# Patient Record
Sex: FEMALE
[De-identification: names, ages, dates, MRNs, and addresses within clinical notes are randomized per-mention and may not be internally consistent; named-entity substitution may affect disease eponyms.]

---

## 2017-09-25 ENCOUNTER — RESULT REVIEW (OUTPATIENT)
Age: 47
End: 2017-09-25

## 2018-09-26 ENCOUNTER — RESULT REVIEW (OUTPATIENT)
Age: 48
End: 2018-09-26

## 2019-03-26 PROBLEM — Z00.00 ENCOUNTER FOR PREVENTIVE HEALTH EXAMINATION: Status: ACTIVE | Noted: 2019-03-26

## 2019-09-13 ENCOUNTER — RECORD ABSTRACTING (OUTPATIENT)
Age: 49
End: 2019-09-13

## 2019-09-13 DIAGNOSIS — Z82.49 FAMILY HISTORY OF ISCHEMIC HEART DISEASE AND OTHER DISEASES OF THE CIRCULATORY SYSTEM: ICD-10-CM

## 2019-09-13 DIAGNOSIS — Z87.19 PERSONAL HISTORY OF OTHER DISEASES OF THE DIGESTIVE SYSTEM: ICD-10-CM

## 2019-09-13 DIAGNOSIS — R87.610 ATYPICAL SQUAMOUS CELLS OF UNDETERMINED SIGNIFICANCE ON CYTOLOGIC SMEAR OF CERVIX (ASC-US): ICD-10-CM

## 2019-09-13 DIAGNOSIS — N96 RECURRENT PREGNANCY LOSS: ICD-10-CM

## 2019-09-13 DIAGNOSIS — Z80.41 FAMILY HISTORY OF MALIGNANT NEOPLASM OF OVARY: ICD-10-CM

## 2019-09-13 DIAGNOSIS — Z87.448 PERSONAL HISTORY OF OTHER DISEASES OF URINARY SYSTEM: ICD-10-CM

## 2019-09-13 DIAGNOSIS — D68.59 OTHER PRIMARY THROMBOPHILIA: ICD-10-CM

## 2019-09-13 DIAGNOSIS — Z80.49 FAMILY HISTORY OF MALIGNANT NEOPLASM OF OTHER GENITAL ORGANS: ICD-10-CM

## 2019-09-13 DIAGNOSIS — E11.9 TYPE 2 DIABETES MELLITUS W/OUT COMPLICATIONS: ICD-10-CM

## 2019-09-13 DIAGNOSIS — Z80.3 FAMILY HISTORY OF MALIGNANT NEOPLASM OF BREAST: ICD-10-CM

## 2019-09-13 DIAGNOSIS — E72.12 METHYLENETETRAHYDROFOLATE REDUCTASE DEFICIENCY: ICD-10-CM

## 2019-09-13 DIAGNOSIS — I10 ESSENTIAL (PRIMARY) HYPERTENSION: ICD-10-CM

## 2019-09-13 DIAGNOSIS — Z86.39 PERSONAL HISTORY OF OTHER ENDOCRINE, NUTRITIONAL AND METABOLIC DISEASE: ICD-10-CM

## 2019-09-13 DIAGNOSIS — Z86.19 PERSONAL HISTORY OF OTHER INFECTIOUS AND PARASITIC DISEASES: ICD-10-CM

## 2019-09-13 LAB — CYTOLOGY CVX/VAG DOC THIN PREP: NORMAL

## 2019-09-13 RX ORDER — METOPROLOL SUCCINATE 50 MG/1
50 TABLET, EXTENDED RELEASE ORAL
Refills: 0 | Status: ACTIVE | COMMUNITY

## 2019-10-11 ENCOUNTER — APPOINTMENT (OUTPATIENT)
Dept: OBGYN | Facility: CLINIC | Age: 49
End: 2019-10-11
Payer: COMMERCIAL

## 2019-10-11 VITALS
HEIGHT: 62 IN | SYSTOLIC BLOOD PRESSURE: 144 MMHG | BODY MASS INDEX: 28.16 KG/M2 | WEIGHT: 153 LBS | DIASTOLIC BLOOD PRESSURE: 88 MMHG

## 2019-10-11 DIAGNOSIS — E28.2 POLYCYSTIC OVARIAN SYNDROME: ICD-10-CM

## 2019-10-11 DIAGNOSIS — Z12.31 ENCOUNTER FOR SCREENING MAMMOGRAM FOR MALIGNANT NEOPLASM OF BREAST: ICD-10-CM

## 2019-10-11 PROCEDURE — 99396 PREV VISIT EST AGE 40-64: CPT

## 2019-10-11 PROCEDURE — 99213 OFFICE O/P EST LOW 20 MIN: CPT | Mod: 25

## 2019-10-11 PROCEDURE — 36415 COLL VENOUS BLD VENIPUNCTURE: CPT

## 2019-10-11 RX ORDER — CHOLECALCIFEROL (VITAMIN D3) 50 MCG
100 CAPSULE ORAL
Refills: 0 | Status: COMPLETED | COMMUNITY
End: 2019-10-11

## 2019-10-11 RX ORDER — LACTOBACILLUS ACIDOPHILUS 0.5 MG
TABLET ORAL
Refills: 0 | Status: COMPLETED | COMMUNITY
End: 2019-10-11

## 2019-10-15 LAB
BASOPHILS # BLD AUTO: 0.05 K/UL
BASOPHILS NFR BLD AUTO: 0.7 %
EOSINOPHIL # BLD AUTO: 0.15 K/UL
EOSINOPHIL NFR BLD AUTO: 2.1 %
FSH SERPL-MCNC: 1.8 IU/L
HCT VFR BLD CALC: 36.5 %
HGB BLD-MCNC: 11.3 G/DL
HPV HIGH+LOW RISK DNA PNL CVX: NOT DETECTED
IMM GRANULOCYTES NFR BLD AUTO: 0.1 %
LH SERPL-ACNC: 4.5 IU/L
LYMPHOCYTES # BLD AUTO: 2.16 K/UL
LYMPHOCYTES NFR BLD AUTO: 29.9 %
MAN DIFF?: NORMAL
MCHC RBC-ENTMCNC: 29.5 PG
MCHC RBC-ENTMCNC: 31 GM/DL
MCV RBC AUTO: 95.3 FL
MONOCYTES # BLD AUTO: 0.53 K/UL
MONOCYTES NFR BLD AUTO: 7.3 %
NEUTROPHILS # BLD AUTO: 4.32 K/UL
NEUTROPHILS NFR BLD AUTO: 59.9 %
PLATELET # BLD AUTO: 290 K/UL
PROLACTIN SERPL-MCNC: 6.8 NG/ML
RBC # BLD: 3.83 M/UL
RBC # FLD: 13.9 %
TSH SERPL-ACNC: 1.08 UIU/ML
WBC # FLD AUTO: 7.22 K/UL

## 2019-10-16 NOTE — HISTORY OF PRESENT ILLNESS
[1 Year Ago] : 1 year ago [Good] : being in good health [Healthy Diet] : a healthy diet [Regular Exercise] : regular exercise [Last Pap ___] : Last cervical pap smear was [unfilled] [Pregnancy History] : pregnancy history: [Definite:  ___ (Date)] : the last menstrual period was [unfilled] [Menarche Age: ____] : age at menarche was [unfilled] [Sexually Active] : is sexually active [Male ___] : [unfilled] male [No] : no [Perimenopausal] : is perimenopausal [Tubal Occlusion] : has had a tubal occlusion [Contraception] : does not use contraception [Burning] : no burning [Itching] : no itching [Mass] : no mass [Stinging] : no stinging [Lesion] : no lesion [Soreness] : no soreness [Discharge] : no discharge [Localized Pain] : no localized pain [Mass (___cm)] : no palpable mass [Diffused Pain] : no diffused pain [Nipple Discharge] : no nipple discharge [Skin Color Change] : no skin color change [Hot Flashes] : no hot flashes [Night Sweats] : no night sweats [de-identified] : Essure

## 2019-10-16 NOTE — DISCUSSION/SUMMARY
[FreeTextEntry1] : F/U pap.\par \par Prescription given for mammo and sono due in March 2020. She agrees to change to [facility]. They will obtain her mammo records for comparison.\par \par She is aware of the need for colonoscopy now that she is 49.\par \par Differential diagnosis of dysfunctional uterine bleeding discussed at length including structural, hormonal, and malignant causes. A pelvic ultrasound was ordered as well. We also discussed the need for hysteroscopy with biopsy to R/O structural and malignant causes. The procedure was discussed in detail. She will premedicate with motrin as there is cramping associated with this procedure.\par \par Two weeks after her above work up is completed, she will return to discuss all results and discuss a treatment plan.\par

## 2019-10-16 NOTE — PHYSICAL EXAM
[Awake] : awake [Alert] : alert [Acute Distress] : no acute distress [Mass] : no breast mass [Nipple Discharge] : no nipple discharge [Axillary LAD] : no axillary lymphadenopathy [Soft] : soft [Tender] : non tender [Distended] : not distended [Oriented x3] : oriented to person, place, and time [Depressed Mood] : not depressed [Flat Affect] : affect not flat [Normal] : uterus [Labia Majora] : labia major [Labia Minora] : labia minora [Atrophy] : atrophy [No Bleeding] : there was no active vaginal bleeding [Pap Obtained] : a Pap smear was performed [Tenderness] : nontender [Adnexa Tenderness] : were not tender [No Tenderness] : no rectal tenderness [Occult Blood] : occult blood test from digital rectal exam was negative

## 2019-10-16 NOTE — REVIEW OF SYSTEMS
[Nl] : Integumentary [Incontinence] : incontinence [Fever] : no fever [Chills] : no chills [Dyspnea] : no shortness of breath [Abdominal Pain] : no abdominal pain [Vomiting] : no vomiting [Pelvic Pain] : no pelvic pain [Abn Vag Bleeding] : abnormal vaginal bleeding [Frequency] : no frequency

## 2019-10-19 LAB — CYTOLOGY CVX/VAG DOC THIN PREP: NORMAL

## 2019-11-06 ENCOUNTER — APPOINTMENT (OUTPATIENT)
Dept: OBGYN | Facility: CLINIC | Age: 49
End: 2019-11-06
Payer: COMMERCIAL

## 2019-11-06 ENCOUNTER — ASOB RESULT (OUTPATIENT)
Age: 49
End: 2019-11-06

## 2019-11-06 VITALS
DIASTOLIC BLOOD PRESSURE: 70 MMHG | WEIGHT: 150 LBS | HEIGHT: 62 IN | BODY MASS INDEX: 27.6 KG/M2 | SYSTOLIC BLOOD PRESSURE: 138 MMHG

## 2019-11-06 LAB
HCG UR QL: NEGATIVE
QUALITY CONTROL: YES

## 2019-11-06 PROCEDURE — 76830 TRANSVAGINAL US NON-OB: CPT

## 2019-11-06 PROCEDURE — 81025 URINE PREGNANCY TEST: CPT

## 2019-11-06 PROCEDURE — 58558Z: CUSTOM

## 2019-11-06 NOTE — HISTORY OF PRESENT ILLNESS
[Last Mammogram ___] : Last Mammogram was [unfilled] [Last Pap ___] : Last cervical pap smear was [unfilled] [Reproductive Age] : is of reproductive age [Pregnancy History] : pregnancy history: [Definite:  ___ (Date)] : the last menstrual period was [unfilled] [Menarche Age: ____] : age at menarche was [unfilled] [Sexually Active] : is sexually active [Monogamous] : is monogamous [Male ___] : [unfilled] male [Menstrual Problems] : reports abnormal menses [Total Preg ___] : [unfilled] [Full Term ___] : [unfilled] [Living ___] : [unfilled] [AB Spont ___] : miscarriages: [unfilled] [Contraception] : uses contraception [de-identified] : 10/11/2019 [de-identified] : Breast u/s: 03/18/2019 BR2 [de-identified] : Essure [de-identified] : Essure

## 2019-11-06 NOTE — END OF VISIT
[FreeTextEntry3] : I, John Bowers, acted solely as a scribe for Dr. Camilo on this date 11/06/2019.\par All medical record entries made by the Scribe were at my, Dr. Camilo's direction and personally dictated by me on  11/06/2019. I have reviewed the chart and agree that the record accurately reflects my personal performance of the history, physical exam, assessment and plan. I have also personally directed, reviewed, and agreed with the chart.\par \par

## 2019-11-10 LAB — CORE LAB BIOPSY: NORMAL

## 2020-02-26 ENCOUNTER — ASOB RESULT (OUTPATIENT)
Age: 50
End: 2020-02-26

## 2020-02-26 ENCOUNTER — APPOINTMENT (OUTPATIENT)
Dept: OBGYN | Facility: CLINIC | Age: 50
End: 2020-02-26
Payer: COMMERCIAL

## 2020-02-26 VITALS
BODY MASS INDEX: 27.97 KG/M2 | HEIGHT: 62 IN | DIASTOLIC BLOOD PRESSURE: 74 MMHG | WEIGHT: 152 LBS | SYSTOLIC BLOOD PRESSURE: 124 MMHG

## 2020-02-26 PROCEDURE — 99213 OFFICE O/P EST LOW 20 MIN: CPT | Mod: 25

## 2020-02-26 PROCEDURE — 76830 TRANSVAGINAL US NON-OB: CPT

## 2020-02-26 NOTE — REVIEW OF SYSTEMS
[Nl] : Musculoskeletal [Fever] : no fever [Abn Vag Bleeding] : abnormal vaginal bleeding [Chills] : no chills [Pelvic Pain] : no pelvic pain

## 2020-02-26 NOTE — PROCEDURE
[Abnormal Uterine Bleeding] : abnormal uterine bleeding [Transvaginal Ultrasound] : transvaginal ultrasound [Suspected Polycystic Ovaries] : suspected polycystic ovaries [Anteverted] : anteverted [Present] : uterus present [L: ___ cm] : L: [unfilled] cm [W: ___cm] : W: [unfilled] cm [H: ___ cm] : H: [unfilled] cm [FreeTextEntry7] : 3.62x1.7x1.93 [FreeTextEntry4] : endo thickness 1.4cm, RT cyst no longer seen, unchanged fibroid [FreeTextEntry8] : oophorectomy

## 2020-02-26 NOTE — HISTORY OF PRESENT ILLNESS
[___ Month(s) Ago] : [unfilled] month(s) ago [Good] : being in good health [Last Mammogram ___] : Last Mammogram was [unfilled] [Last Pap ___] : Last cervical pap smear was [unfilled] [Perimenopausal] : is perimenopausal [Contraception] : uses contraception [Pregnancy History] : pregnancy history: [Sexually Active] : is sexually active [Male ___] : [unfilled] male [Definite:  ___ (Date)] : the last menstrual period was [unfilled] [Menarche Age: ____] : age at menarche was [unfilled] [Menstrual Problems] : reports normal menses

## 2020-02-26 NOTE — PHYSICAL EXAM
[Alert] : alert [Awake] : awake [Oriented x3] : oriented to person, place, and time [Acute Distress] : no acute distress [Flat Affect] : affect not flat [Depressed Mood] : not depressed

## 2020-11-04 ENCOUNTER — RESULT REVIEW (OUTPATIENT)
Age: 50
End: 2020-11-04

## 2020-11-04 ENCOUNTER — APPOINTMENT (OUTPATIENT)
Dept: MAMMOGRAPHY | Facility: CLINIC | Age: 50
End: 2020-11-04
Payer: COMMERCIAL

## 2020-11-04 ENCOUNTER — APPOINTMENT (OUTPATIENT)
Dept: ULTRASOUND IMAGING | Facility: CLINIC | Age: 50
End: 2020-11-04
Payer: COMMERCIAL

## 2020-11-04 PROCEDURE — 76641 ULTRASOUND BREAST COMPLETE: CPT | Mod: 50

## 2020-11-04 PROCEDURE — 77063 BREAST TOMOSYNTHESIS BI: CPT

## 2020-11-04 PROCEDURE — 77067 SCR MAMMO BI INCL CAD: CPT

## 2020-12-21 PROBLEM — Z12.31 ENCOUNTER FOR SCREENING MAMMOGRAM FOR BREAST CANCER: Status: RESOLVED | Noted: 2019-10-11 | Resolved: 2020-12-21

## 2021-01-22 ENCOUNTER — APPOINTMENT (OUTPATIENT)
Dept: OBGYN | Facility: CLINIC | Age: 51
End: 2021-01-22
Payer: COMMERCIAL

## 2021-01-22 VITALS
DIASTOLIC BLOOD PRESSURE: 78 MMHG | SYSTOLIC BLOOD PRESSURE: 130 MMHG | HEIGHT: 62 IN | BODY MASS INDEX: 29.12 KG/M2 | WEIGHT: 158.25 LBS

## 2021-01-22 DIAGNOSIS — Z87.42 PERSONAL HISTORY OF OTHER DISEASES OF THE FEMALE GENITAL TRACT: ICD-10-CM

## 2021-01-22 DIAGNOSIS — Z01.419 ENCOUNTER FOR GYNECOLOGICAL EXAMINATION (GENERAL) (ROUTINE) W/OUT ABNORMAL FINDINGS: ICD-10-CM

## 2021-01-22 DIAGNOSIS — Z80.42 FAMILY HISTORY OF MALIGNANT NEOPLASM OF PROSTATE: ICD-10-CM

## 2021-01-22 DIAGNOSIS — K58.9 IRRITABLE BOWEL SYNDROME W/OUT DIARRHEA: ICD-10-CM

## 2021-01-22 DIAGNOSIS — Z12.31 ENCOUNTER FOR SCREENING MAMMOGRAM FOR MALIGNANT NEOPLASM OF BREAST: ICD-10-CM

## 2021-01-22 DIAGNOSIS — E88.81 METABOLIC SYNDROME: ICD-10-CM

## 2021-01-22 PROCEDURE — 99396 PREV VISIT EST AGE 40-64: CPT

## 2021-01-22 PROCEDURE — 82270 OCCULT BLOOD FECES: CPT

## 2021-01-22 PROCEDURE — 99072 ADDL SUPL MATRL&STAF TM PHE: CPT

## 2021-01-26 PROBLEM — K58.9 IBS (IRRITABLE BOWEL SYNDROME): Status: ACTIVE | Noted: 2021-01-22

## 2021-01-26 PROBLEM — Z01.419 WELL WOMAN EXAM WITH ROUTINE GYNECOLOGICAL EXAM: Status: RESOLVED | Noted: 2019-10-11 | Resolved: 2021-01-26

## 2021-01-26 PROBLEM — Z87.42 HISTORY OF MENORRHAGIA: Status: RESOLVED | Noted: 2019-10-11 | Resolved: 2021-01-26

## 2021-01-26 PROBLEM — E88.81 INSULIN RESISTANCE: Status: ACTIVE | Noted: 2021-01-22

## 2021-01-26 LAB — HPV HIGH+LOW RISK DNA PNL CVX: NOT DETECTED

## 2021-01-26 NOTE — END OF VISIT
[FreeTextEntry3] : I, John Bowers, acted solely as a scribe for Dr. Camilo on this date 01/22/2021.\par All medical record entries made by the Scribe were at my, Dr. Camilo's direction and personally dictated by me on  01/22/2021. I have reviewed the chart and agree that the record accurately reflects my personal performance of the history, physical exam, assessment and plan. I have also personally directed, reviewed, and agreed with the chart.\par

## 2021-01-26 NOTE — PHYSICAL EXAM
[Appropriately responsive] : appropriately responsive [Alert] : alert [No Acute Distress] : no acute distress [Soft] : soft [Non-tender] : non-tender [Non-distended] : non-distended [Oriented x3] : oriented x3 [Examination Of The Breasts] : a normal appearance [No Masses] : no breast masses were palpable [Labia Majora] : normal [Labia Minora] : normal [No Bleeding] : There was no active vaginal bleeding [Normal] : normal [Uterine Adnexae] : normal [No Tenderness] : no tenderness [Nl Sphincter Tone] : normal sphincter tone [FreeTextEntry9] : Guaiac negative

## 2021-01-26 NOTE — DISCUSSION/SUMMARY
[FreeTextEntry1] : F/U pap.\par \par Self breast exam was reviewed.\par \par We discussed the need for screening colonoscopy. \par \par Prescription for mammogram screening and breast US were given today.\par \par She will follow up annually and as needed.\par

## 2021-01-26 NOTE — REVIEW OF SYSTEMS
[Anxiety] : anxiety [Negative] : Heme/Lymph [Patient Intake Form Reviewed] : Patient intake form was reviewed

## 2021-01-26 NOTE — HISTORY OF PRESENT ILLNESS
[Patient reported PAP Smear was normal] : Patient reported PAP Smear was normal [Menarche Age ____] : age at menarche was [unfilled] [Definite ___ (Date)] : the last menstrual period was [unfilled] [Excessive Bleeding] : bleeding has been excessive [Regular Cycle Intervals] : have been regular [Monogamous (Male Partner)] : is monogamous with a male partner [Y] : Positive pregnancy history [Menarche Age: ____] : age at menarche was [unfilled] [Currently Active] : currently active [Men] : men [Vaginal] : vaginal [No] : No [Patient refuses STI testing] : Patient refuses STI testing [TextBox_4] : PATIENT PRESENTS FOR ANNUAL  [Mammogramdate] : 11/04/20 [TextBox_19] : BR2 [BreastSonogramDate] : 11/04/20 [TextBox_25] : BR2 [PapSmeardate] : 10/11/19 [TextBox_31] : NEG  [BoneDensityDate] : N/A [ColonoscopyDate] : N/A [LMPDate] : 01/10/21 [PGxTotal] : 7 [[facility]] : 4 [[facility]] : 4 [PGxABSpont] : 3 [FreeTextEntry1] : 01/10/21 [FreeTextEntry3] : ESSURE

## 2021-01-27 LAB — CYTOLOGY CVX/VAG DOC THIN PREP: NORMAL

## 2022-01-25 ENCOUNTER — APPOINTMENT (OUTPATIENT)
Dept: OBGYN | Facility: CLINIC | Age: 52
End: 2022-01-25
Payer: COMMERCIAL

## 2022-01-25 VITALS
WEIGHT: 137 LBS | BODY MASS INDEX: 25.21 KG/M2 | DIASTOLIC BLOOD PRESSURE: 74 MMHG | SYSTOLIC BLOOD PRESSURE: 124 MMHG | HEIGHT: 62 IN

## 2022-01-25 DIAGNOSIS — Z86.39 PERSONAL HISTORY OF OTHER ENDOCRINE, NUTRITIONAL AND METABOLIC DISEASE: ICD-10-CM

## 2022-01-25 DIAGNOSIS — Z12.11 ENCOUNTER FOR SCREENING FOR MALIGNANT NEOPLASM OF COLON: ICD-10-CM

## 2022-01-25 DIAGNOSIS — R92.2 INCONCLUSIVE MAMMOGRAM: ICD-10-CM

## 2022-01-25 DIAGNOSIS — Z12.31 ENCOUNTER FOR SCREENING MAMMOGRAM FOR MALIGNANT NEOPLASM OF BREAST: ICD-10-CM

## 2022-01-25 DIAGNOSIS — Z01.419 ENCOUNTER FOR GYNECOLOGICAL EXAMINATION (GENERAL) (ROUTINE) W/OUT ABNORMAL FINDINGS: ICD-10-CM

## 2022-01-25 PROCEDURE — 82270 OCCULT BLOOD FECES: CPT

## 2022-01-25 PROCEDURE — 99396 PREV VISIT EST AGE 40-64: CPT

## 2022-01-25 RX ORDER — LISINOPRIL 10 MG/1
10 TABLET ORAL
Refills: 0 | Status: ACTIVE | COMMUNITY

## 2022-01-27 LAB
DATE COLLECTED: NORMAL
HEMOCCULT SP1 STL QL: NEGATIVE
HPV HIGH+LOW RISK DNA PNL CVX: NOT DETECTED
QUALITY CONTROL: YES

## 2022-01-28 NOTE — END OF VISIT
[FreeTextEntry3] : I, Ollie Whittaker solely acted as scribe for Dr. Cyndi Camilo on 01/25/2022 \par All medical entries made by the Scribe were at my, Dr. Camilo’s, direction and personally\par dictated by me on 01/25/2022 . I have reviewed the chart and agree that the record\par accurately reflects my personal performance of the history, physical exam, assessment and plan. I\par have also personally directed, reviewed, and agreed with the chart.

## 2022-01-28 NOTE — HISTORY OF PRESENT ILLNESS
[N] : Patient reports normal menses [Y] : Positive pregnancy history [Menarche Age: ____] : age at menarche was [unfilled] [Currently Active] : currently active [No] : No [Mammogramdate] : 11/04/20 [TextBox_19] : BR2 [BreastSonogramDate] : 11/4/20 [TextBox_25] : BR2 [PapSmeardate] : 01/22/21 [TextBox_31] : NEG [HPVDate] : 01/22/21 [TextBox_78] : NEG [LMPDate] : 01/25/22 [PGxTotal] : 7 [[facility]] : 4 [facility]] : 4 [PGxABSpont] : 3 [FreeTextEntry1] : 01/25/22

## 2022-01-28 NOTE — PHYSICAL EXAM
[Chaperone Present] : A chaperone was present in the examining room during all aspects of the physical examination [Appropriately responsive] : appropriately responsive [Alert] : alert [No Acute Distress] : no acute distress [Soft] : soft [Non-tender] : non-tender [Non-distended] : non-distended [Oriented x3] : oriented x3 [Examination Of The Breasts] : a normal appearance [No Discharge] : no discharge [No Masses] : no breast masses were palpable [Scant] : There was scant vaginal bleeding [Normal] : normal [Uterine Adnexae] : normal [FreeTextEntry1] : MA: Jamie [FreeTextEntry9] : Stool for occult blood.

## 2022-01-31 LAB — CYTOLOGY CVX/VAG DOC THIN PREP: NORMAL

## 2022-02-22 ENCOUNTER — APPOINTMENT (OUTPATIENT)
Dept: ULTRASOUND IMAGING | Facility: CLINIC | Age: 52
End: 2022-02-22

## 2022-02-22 ENCOUNTER — APPOINTMENT (OUTPATIENT)
Dept: MAMMOGRAPHY | Facility: CLINIC | Age: 52
End: 2022-02-22
Payer: COMMERCIAL

## 2022-02-22 ENCOUNTER — RESULT REVIEW (OUTPATIENT)
Age: 52
End: 2022-02-22

## 2022-02-22 PROCEDURE — 77063 BREAST TOMOSYNTHESIS BI: CPT

## 2022-02-22 PROCEDURE — 77067 SCR MAMMO BI INCL CAD: CPT

## 2022-02-22 PROCEDURE — 76641 ULTRASOUND BREAST COMPLETE: CPT | Mod: 50

## 2022-03-21 ENCOUNTER — NON-APPOINTMENT (OUTPATIENT)
Age: 52
End: 2022-03-21

## 2023-03-02 ENCOUNTER — NON-APPOINTMENT (OUTPATIENT)
Age: 53
End: 2023-03-02

## 2023-03-02 ENCOUNTER — APPOINTMENT (OUTPATIENT)
Dept: OBGYN | Facility: CLINIC | Age: 53
End: 2023-03-02
Payer: COMMERCIAL

## 2023-03-02 VITALS
BODY MASS INDEX: 26.31 KG/M2 | SYSTOLIC BLOOD PRESSURE: 160 MMHG | DIASTOLIC BLOOD PRESSURE: 86 MMHG | WEIGHT: 143 LBS | HEIGHT: 62 IN

## 2023-03-02 DIAGNOSIS — R92.2 INCONCLUSIVE MAMMOGRAM: ICD-10-CM

## 2023-03-02 DIAGNOSIS — N83.299 OTHER OVARIAN CYST, UNSPECIFIED SIDE: ICD-10-CM

## 2023-03-02 DIAGNOSIS — Z12.11 ENCOUNTER FOR SCREENING FOR MALIGNANT NEOPLASM OF COLON: ICD-10-CM

## 2023-03-02 DIAGNOSIS — Z87.2 PERSONAL HISTORY OF DISEASES OF THE SKIN AND SUBCUTANEOUS TISSUE: ICD-10-CM

## 2023-03-02 DIAGNOSIS — Z87.898 PERSONAL HISTORY OF OTHER SPECIFIED CONDITIONS: ICD-10-CM

## 2023-03-02 DIAGNOSIS — Z01.411 ENCOUNTER FOR GYNECOLOGICAL EXAMINATION (GENERAL) (ROUTINE) WITH ABNORMAL FINDINGS: ICD-10-CM

## 2023-03-02 DIAGNOSIS — Z12.31 ENCOUNTER FOR SCREENING MAMMOGRAM FOR MALIGNANT NEOPLASM OF BREAST: ICD-10-CM

## 2023-03-02 PROCEDURE — 99396 PREV VISIT EST AGE 40-64: CPT

## 2023-03-02 RX ORDER — CYANOCOBALAMIN
1000 KIT
Refills: 0 | Status: ACTIVE | COMMUNITY

## 2023-03-02 RX ORDER — UREA 10 %
50 LOTION (ML) TOPICAL
Refills: 0 | Status: ACTIVE | COMMUNITY

## 2023-03-02 RX ORDER — ZINC SULFATE 50(220)MG
50 CAPSULE ORAL
Refills: 0 | Status: ACTIVE | COMMUNITY

## 2023-03-06 LAB — HPV HIGH+LOW RISK DNA PNL CVX: NOT DETECTED

## 2023-03-06 NOTE — HISTORY OF PRESENT ILLNESS
[N] : Patient reports normal menses [Y] : Positive pregnancy history [Menarche Age: ____] : age at menarche was [unfilled] [Currently Active] : currently active [No] : No [Mammogramdate] : 02/22/2022 [TextBox_19] : BR1 [BreastSonogramDate] : 02/22/2022 [TextBox_25] : BR1 [PapSmeardate] : 01/25/2022 [TextBox_31] : NEG [HPVDate] : 01/25/2022 [TextBox_78] : NEG [LMPDate] : 01/25/2023 [PGxTotal] : 7 [[facility]xFullTerm] : 4 [[facility]xLiving] : 4 [PGxABSpont] : 3 [FreeTextEntry1] : 01/25/2023

## 2023-03-08 LAB — CYTOLOGY CVX/VAG DOC THIN PREP: NORMAL

## 2024-04-08 ENCOUNTER — APPOINTMENT (OUTPATIENT)
Dept: OBGYN | Facility: CLINIC | Age: 54
End: 2024-04-08
Payer: COMMERCIAL

## 2024-04-08 ENCOUNTER — NON-APPOINTMENT (OUTPATIENT)
Age: 54
End: 2024-04-08

## 2024-04-08 ENCOUNTER — LABORATORY RESULT (OUTPATIENT)
Age: 54
End: 2024-04-08

## 2024-04-08 VITALS
SYSTOLIC BLOOD PRESSURE: 152 MMHG | HEIGHT: 62 IN | DIASTOLIC BLOOD PRESSURE: 80 MMHG | WEIGHT: 126 LBS | BODY MASS INDEX: 23.19 KG/M2

## 2024-04-08 DIAGNOSIS — R23.2 FLUSHING: ICD-10-CM

## 2024-04-08 DIAGNOSIS — Z12.31 ENCOUNTER FOR SCREENING MAMMOGRAM FOR MALIGNANT NEOPLASM OF BREAST: ICD-10-CM

## 2024-04-08 DIAGNOSIS — R92.30 DENSE BREASTS, UNSPECIFIED: ICD-10-CM

## 2024-04-08 DIAGNOSIS — R61 GENERALIZED HYPERHIDROSIS: ICD-10-CM

## 2024-04-08 DIAGNOSIS — Z01.411 ENCOUNTER FOR GYNECOLOGICAL EXAMINATION (GENERAL) (ROUTINE) WITH ABNORMAL FINDINGS: ICD-10-CM

## 2024-04-08 DIAGNOSIS — Z13.31 ENCOUNTER FOR SCREENING FOR DEPRESSION: ICD-10-CM

## 2024-04-08 DIAGNOSIS — R45.86 EMOTIONAL LABILITY: ICD-10-CM

## 2024-04-08 DIAGNOSIS — Z01.419 ENCOUNTER FOR GYNECOLOGICAL EXAMINATION (GENERAL) (ROUTINE) W/OUT ABNORMAL FINDINGS: ICD-10-CM

## 2024-04-08 DIAGNOSIS — R53.83 OTHER FATIGUE: ICD-10-CM

## 2024-04-08 DIAGNOSIS — N92.1 EXCESSIVE AND FREQUENT MENSTRUATION WITH IRREGULAR CYCLE: ICD-10-CM

## 2024-04-08 PROCEDURE — 99215 OFFICE O/P EST HI 40 MIN: CPT | Mod: 25

## 2024-04-08 PROCEDURE — 99396 PREV VISIT EST AGE 40-64: CPT

## 2024-04-08 PROCEDURE — 99459 PELVIC EXAMINATION: CPT

## 2024-04-08 RX ORDER — ASCORBIC ACID 500 MG
TABLET ORAL
Refills: 0 | Status: ACTIVE | COMMUNITY

## 2024-04-08 RX ORDER — ASPIRIN 81 MG
81 TABLET,CHEWABLE ORAL
Refills: 0 | Status: ACTIVE | COMMUNITY

## 2024-04-15 NOTE — PLAN
[FreeTextEntry1] : We discussed hot flashes as they relate to the perimenopause/menopause transition.   If her quality of life is being affected by vasomotor symptoms, we discussed the importance of treatment. She is not a candidate for HRT secondary to her MTHFR deficiency. We also discussed non hormonal treatment options for vasomotor symptoms. Given her symptoms of depression as well, I recommend treatment with SSRI Effexor or paxil.  We discussed Effexor and Paxil as a treatment option. I explained that it is a medication that treats depression/anxiety, but a side effect is reduction in hot flashes. We also discussed side effects of Effexor, most commonly weight gain.  I will send over Paxil to help reduce her hot flashes. She will try it for a few months to see if she notices an improvement. If it doesn't work for her in two months, she will return to the office for another evaluation.   She also knows to stop taking Estroven OTC as it could have estrogen like effects.  Pap done today.  Prescription for mammogram screening and breast US given.  Self-breast exam reviewed.  She will follow up annually and as needed.  During this visit 45 minutes were spent face-to-face with greater than 50% of the time dedicated to counseling and discussion of her perimenopausal symptoms and treatment options..

## 2024-04-15 NOTE — HISTORY OF PRESENT ILLNESS
[Tubal Occlusion] : has had a tubal occlusion [Y] : Positive pregnancy history [Menarche Age: ____] : age at menarche was [unfilled] [No] : Patient does not have concerns regarding sex [Currently Active] : currently active [Mammogramdate] : 2/22/22 [TextBox_19] : br1 [BreastSonogramDate] : 2/22/22 [TextBox_25] : br1 [TextBox_31] : wnl [PapSmeardate] : 3/2/23 [HPVDate] : 3/2/23 [TextBox_78] : neg [LMPDate] : 2/24/24 [PGxTotal] : 7 [[facility]] : 4 [[facility]xLiving] : 4 [PGxABSpont] : 3 [FreeTextEntry1] : 2/24/24

## 2024-04-15 NOTE — PHYSICAL EXAM
[Chaperone Present] : A chaperone was present in the examining room during all aspects of the physical examination [Appropriately responsive] : appropriately responsive [Alert] : alert [No Acute Distress] : no acute distress [Soft] : soft [Non-tender] : non-tender [Non-distended] : non-distended [Oriented x3] : oriented x3 [Examination Of The Breasts] : a normal appearance [No Discharge] : no discharge [No Masses] : no breast masses were palpable [Labia Majora] : normal [Labia Minora] : normal [No Bleeding] : There was no active vaginal bleeding [Normal] : normal [Uterine Adnexae] : normal [FreeTextEntry4] : Cystocele and rectocele to introitus with Lesley Rk.

## 2024-04-15 NOTE — END OF VISIT
[Time Spent: ___ minutes] : I have spent [unfilled] minutes of time on the encounter. [FreeTextEntry3] : I, Ollie Whittaker solely acted as scribe for Dr. Cyndi Camilo on 04/08/2024  All medical entries made by the Scribe were at my, Dr. Thornton, direction and personally dictated by me on 04/08/2024 . I have reviewed the chart and agree that the record accurately reflects my personal performance of the history, physical exam, assessment and plan. I have also personally directed, reviewed, and agreed with the chart.

## 2024-04-16 ENCOUNTER — NON-APPOINTMENT (OUTPATIENT)
Age: 54
End: 2024-04-16

## 2024-04-16 LAB
CYTOLOGY CVX/VAG DOC THIN PREP: ABNORMAL
HPV HIGH+LOW RISK DNA PNL CVX: DETECTED

## 2024-05-23 ENCOUNTER — APPOINTMENT (OUTPATIENT)
Dept: OBGYN | Facility: CLINIC | Age: 54
End: 2024-05-23
Payer: COMMERCIAL

## 2024-05-23 VITALS
WEIGHT: 125 LBS | BODY MASS INDEX: 23 KG/M2 | DIASTOLIC BLOOD PRESSURE: 88 MMHG | SYSTOLIC BLOOD PRESSURE: 148 MMHG | HEIGHT: 62 IN

## 2024-05-23 DIAGNOSIS — R87.810 ATYPICAL SQUAMOUS CELLS OF UNDETERMINED SIGNIFICANCE ON CYTOLOGIC SMEAR OF CERVIX (ASC-US): ICD-10-CM

## 2024-05-23 DIAGNOSIS — R87.610 ATYPICAL SQUAMOUS CELLS OF UNDETERMINED SIGNIFICANCE ON CYTOLOGIC SMEAR OF CERVIX (ASC-US): ICD-10-CM

## 2024-05-23 DIAGNOSIS — Z32.01 ENCOUNTER FOR PREGNANCY TEST, RESULT POSITIVE: ICD-10-CM

## 2024-05-23 DIAGNOSIS — Z78.9 OTHER SPECIFIED HEALTH STATUS: ICD-10-CM

## 2024-05-23 PROCEDURE — 99459 PELVIC EXAMINATION: CPT

## 2024-05-23 PROCEDURE — 36415 COLL VENOUS BLD VENIPUNCTURE: CPT

## 2024-05-23 PROCEDURE — 81025 URINE PREGNANCY TEST: CPT

## 2024-05-23 PROCEDURE — 57454 BX/CURETT OF CERVIX W/SCOPE: CPT

## 2024-05-23 PROCEDURE — 99213 OFFICE O/P EST LOW 20 MIN: CPT | Mod: 25

## 2024-05-23 RX ORDER — PAROXETINE 7.5 MG/1
7.5 CAPSULE ORAL
Qty: 90 | Refills: 1 | Status: DISCONTINUED | COMMUNITY
Start: 2024-04-08 | End: 2024-05-23

## 2024-05-23 NOTE — PHYSICAL EXAM
[Chaperone Present] : A chaperone was present in the examining room during all aspects of the physical examination [[id number]] : A chaperone was present during the pelvic exam. [Appropriately responsive] : appropriately responsive [Alert] : alert [No Acute Distress] : no acute distress [Oriented x3] : oriented x3

## 2024-05-24 ENCOUNTER — NON-APPOINTMENT (OUTPATIENT)
Age: 54
End: 2024-05-24

## 2024-05-24 LAB
HCG SERPL-MCNC: 5 MIU/ML
HCG UR QL: POSITIVE
QUALITY CONTROL: YES

## 2024-05-25 PROBLEM — Z78.9 NEVER EXERCISES: Status: ACTIVE | Noted: 2024-05-23

## 2024-05-25 RX ORDER — FOLIC ACID 20 MG
CAPSULE ORAL
Refills: 0 | Status: ACTIVE | COMMUNITY

## 2024-05-25 RX ORDER — GLUCOSAMINE/CHONDR SU A SOD 750-600 MG
1000 TABLET ORAL
Refills: 0 | Status: ACTIVE | COMMUNITY

## 2024-05-25 NOTE — HISTORY OF PRESENT ILLNESS
[HPV test offered] : HPV test offered [N] : Patient reports normal menses [Y] : Positive pregnancy history [Menarche Age: ____] : age at menarche was [unfilled] [Currently Active] : currently active [Men] : men [Mammogramdate] : 02/22/2022 [TextBox_19] : BR1 [BreastSonogramDate] : 02/22/2022 [TextBox_25] : BR1 [PapSmeardate] : 04/08/2024 [HPVDate] : 04/08/2024 [TextBox_31] : ASCUS  [TextBox_78] : Positive [LMPDate] : 05/12/2024 [de-identified] : had an Essure.  [[facility]xFull[facility]] : 4 [PGxTotal] : 7 [[facility]] : 4 [PGxABSpont] : 3 [FreeTextEntry1] : 05/12/2024

## 2024-05-25 NOTE — PLAN
[FreeTextEntry1] : Faint positive urine pregnancy test today. Pt very anxious. However, I explained that given her age and Essure, it is likely a false positive. HCG level blood work drawn today.   Colposcopy was done today. Cervical biopsy at 7 o'clock. ECC performed. Hemostasis was observed with silver nitrate. Patient tolerated the procedure well with no complications. Post-procedure instructions were given. Patient expressed understanding. All questions were answered.  She will continue follow up with endo and optho for elevated [rolactin.  F/u pap in one year or as needed.

## 2024-05-25 NOTE — PROCEDURE
[Colposcopy] : Colposcopy  [Consent Obtained] : Consent obtained [Risks] : risks [Benefits] : benefits [Patient] : patient [Infection] : infection [Bleeding] : bleeding [Allergic Reaction] : allergic reaction [ASCUS] : ASCUS [Colposcopy Adequate] : colposcopy adequate [SCI Fully Visualized] : SCI fully visualized [ECC Performed] : ECC performed [Biopsy] : biopsy taken [Hemostasis Obtained] : Hemostasis obtained [Tolerated Well] : the patient tolerated the procedure well [de-identified] : HPV positive.  [de-identified] : 1 [de-identified] : 7 o'clock [de-identified] : With silver nitrate. [de-identified] : Will call with colpo results.   Repeat pap in one year.

## 2024-05-25 NOTE — END OF VISIT
[FreeTextEntry3] : I, Ollie Whittaker solely acted as scribe for Dr. Cyndi Camilo on 05/23/2024  All medical entries made by the Scribe were at my, Dr. Thornton, direction and personally dictated by me on 05/23/2024 . I have reviewed the chart and agree that the record accurately reflects my personal performance of the history, physical exam, assessment and plan. I have also personally directed, reviewed, and agreed with the chart.

## 2024-05-31 ENCOUNTER — RESULT REVIEW (OUTPATIENT)
Age: 54
End: 2024-05-31

## 2024-05-31 ENCOUNTER — APPOINTMENT (OUTPATIENT)
Dept: ULTRASOUND IMAGING | Facility: CLINIC | Age: 54
End: 2024-05-31
Payer: COMMERCIAL

## 2024-05-31 ENCOUNTER — APPOINTMENT (OUTPATIENT)
Dept: MAMMOGRAPHY | Facility: CLINIC | Age: 54
End: 2024-05-31
Payer: COMMERCIAL

## 2024-05-31 PROCEDURE — 77067 SCR MAMMO BI INCL CAD: CPT

## 2024-05-31 PROCEDURE — 77063 BREAST TOMOSYNTHESIS BI: CPT

## 2024-05-31 PROCEDURE — 76641 ULTRASOUND BREAST COMPLETE: CPT | Mod: 50

## 2024-06-02 LAB — CORE LAB BIOPSY: NORMAL

## 2024-06-03 ENCOUNTER — RESULT REVIEW (OUTPATIENT)
Age: 54
End: 2024-06-03

## 2024-06-03 DIAGNOSIS — R92.8 OTHER ABNORMAL AND INCONCLUSIVE FINDINGS ON DIAGNOSTIC IMAGING OF BREAST: ICD-10-CM

## 2024-06-04 ENCOUNTER — APPOINTMENT (OUTPATIENT)
Dept: OBGYN | Facility: CLINIC | Age: 54
End: 2024-06-04
Payer: COMMERCIAL

## 2024-06-04 PROCEDURE — 36415 COLL VENOUS BLD VENIPUNCTURE: CPT

## 2024-06-05 ENCOUNTER — APPOINTMENT (OUTPATIENT)
Dept: MAMMOGRAPHY | Facility: CLINIC | Age: 54
End: 2024-06-05
Payer: COMMERCIAL

## 2024-06-05 ENCOUNTER — RESULT REVIEW (OUTPATIENT)
Age: 54
End: 2024-06-05

## 2024-06-05 ENCOUNTER — APPOINTMENT (OUTPATIENT)
Dept: ULTRASOUND IMAGING | Facility: CLINIC | Age: 54
End: 2024-06-05
Payer: COMMERCIAL

## 2024-06-05 PROCEDURE — 77061 BREAST TOMOSYNTHESIS UNI: CPT | Mod: RT

## 2024-06-05 PROCEDURE — G0279: CPT | Mod: RT

## 2024-06-05 PROCEDURE — 76642 ULTRASOUND BREAST LIMITED: CPT | Mod: RT

## 2024-06-05 PROCEDURE — 77065 DX MAMMO INCL CAD UNI: CPT | Mod: RT

## 2024-06-08 LAB — HCG SERPL-MCNC: 1 MIU/ML

## 2024-10-01 ENCOUNTER — APPOINTMENT (OUTPATIENT)
Dept: ANTEPARTUM | Facility: CLINIC | Age: 54
End: 2024-10-01
Payer: COMMERCIAL

## 2024-10-01 ENCOUNTER — APPOINTMENT (OUTPATIENT)
Dept: OBGYN | Facility: CLINIC | Age: 54
End: 2024-10-01
Payer: COMMERCIAL

## 2024-10-01 ENCOUNTER — ASOB RESULT (OUTPATIENT)
Age: 54
End: 2024-10-01

## 2024-10-01 VITALS
BODY MASS INDEX: 23.19 KG/M2 | SYSTOLIC BLOOD PRESSURE: 122 MMHG | HEIGHT: 62 IN | WEIGHT: 126 LBS | DIASTOLIC BLOOD PRESSURE: 70 MMHG

## 2024-10-01 DIAGNOSIS — E28.2 POLYCYSTIC OVARIAN SYNDROME: ICD-10-CM

## 2024-10-01 DIAGNOSIS — Z13.31 ENCOUNTER FOR SCREENING FOR DEPRESSION: ICD-10-CM

## 2024-10-01 DIAGNOSIS — Z32.01 ENCOUNTER FOR PREGNANCY TEST, RESULT POSITIVE: ICD-10-CM

## 2024-10-01 PROCEDURE — 99213 OFFICE O/P EST LOW 20 MIN: CPT

## 2024-10-01 PROCEDURE — 76857 US EXAM PELVIC LIMITED: CPT | Mod: 59

## 2024-10-01 PROCEDURE — 76830 TRANSVAGINAL US NON-OB: CPT

## 2024-10-07 PROBLEM — Z32.01 POSITIVE URINE PREGNANCY TEST: Status: RESOLVED | Noted: 2024-05-23 | Resolved: 2024-10-07

## 2024-10-07 PROBLEM — Z13.31 POSITIVE SCREENING FOR DEPRESSION ON 9-ITEM PATIENT HEALTH QUESTIONNAIRE (PHQ-9): Status: RESOLVED | Noted: 2024-04-08 | Resolved: 2024-10-07

## 2024-10-07 NOTE — HISTORY OF PRESENT ILLNESS
[N] : Patient reports normal menses [Y] : Positive pregnancy history [Menarche Age: ____] : age at menarche was [unfilled] [Currently Active] : currently active [Men] : men [Mammogramdate] : 06/05/24 [TextBox_19] : BR2 [BreastSonogramDate] : 06/05/24 [TextBox_25] : BR2 [PapSmeardate] : 04/08/24 [TextBox_31] : ASC-US [BoneDensityDate] : NEVER [ColonoscopyDate] : UNSURE [TextBox_43] : pt states in 2011 she had rectal surgery-unsure if they performed a colonoscopy then.  [HPVDate] : 04/08/24 [TextBox_78] : POS, HPV 16&18/45-NEG [LMPDate] : 06/11/24 [PGxTotal] : 7 [[facility]xFull[facility]] : 4 [[facility]] : 4 [PGxABSpont] : 3 [FreeTextEntry1] : 06/11/24

## 2024-10-07 NOTE — END OF VISIT
[FreeTextEntry3] : I, Ollie Whittaker solely acted as scribe for Dr. Cyndi Camilo on 10/01/2024  All medical entries made by the Scribe were at my, Dr. Thornton, direction and personally dictated by me on 10/01/2024 . I have reviewed the chart and agree that the record accurately reflects my personal performance of the history, physical exam, assessment and plan. I have also personally directed, reviewed, and agreed with the chart.

## 2024-10-07 NOTE — PLAN
Pt verbalized understanding of discharge instructions and followup care. Pt ambulated out of ED with a steady gait.    
[FreeTextEntry1] : Sono results reviewed and were wnl. Repeat gynecologic imaging not currently recommended.  F/u annually or as needed. 
[FreeTextEntry1] : Sono results reviewed and were wnl. Repeat gynecologic imaging not currently recommended.  F/u annually or as needed.

## 2024-10-07 NOTE — HISTORY OF PRESENT ILLNESS
[N] : Patient reports normal menses [Y] : Positive pregnancy history [Menarche Age: ____] : age at menarche was [unfilled] [Currently Active] : currently active [Men] : men [Mammogramdate] : 06/05/24 [TextBox_19] : BR2 [BreastSonogramDate] : 06/05/24 [TextBox_25] : BR2 [PapSmeardate] : 04/08/24 [TextBox_31] : ASC-US [BoneDensityDate] : NEVER [ColonoscopyDate] : UNSURE [TextBox_43] : pt states in 2011 she had rectal surgery-unsure if they performed a colonoscopy then.  [HPVDate] : 04/08/24 [TextBox_78] : POS, HPV 16&18/45-NEG [LMPDate] : 06/11/24 [PGxTotal] : 7 [[facility]] : 4 [[facility]xLiving] : 4 [PGxABSpont] : 3 [FreeTextEntry1] : 06/11/24

## 2025-04-11 ENCOUNTER — APPOINTMENT (OUTPATIENT)
Dept: OBGYN | Facility: CLINIC | Age: 55
End: 2025-04-11
Payer: COMMERCIAL

## 2025-04-11 VITALS
SYSTOLIC BLOOD PRESSURE: 140 MMHG | DIASTOLIC BLOOD PRESSURE: 80 MMHG | WEIGHT: 134 LBS | HEIGHT: 62 IN | BODY MASS INDEX: 24.66 KG/M2

## 2025-04-11 DIAGNOSIS — Z12.31 ENCOUNTER FOR SCREENING MAMMOGRAM FOR MALIGNANT NEOPLASM OF BREAST: ICD-10-CM

## 2025-04-11 DIAGNOSIS — Z01.419 ENCOUNTER FOR GYNECOLOGICAL EXAMINATION (GENERAL) (ROUTINE) W/OUT ABNORMAL FINDINGS: ICD-10-CM

## 2025-04-11 DIAGNOSIS — R87.610 ATYPICAL SQUAMOUS CELLS OF UNDETERMINED SIGNIFICANCE ON CYTOLOGIC SMEAR OF CERVIX (ASC-US): ICD-10-CM

## 2025-04-11 DIAGNOSIS — Z87.898 PERSONAL HISTORY OF OTHER SPECIFIED CONDITIONS: ICD-10-CM

## 2025-04-11 DIAGNOSIS — Z13.820 ENCOUNTER FOR SCREENING FOR OSTEOPOROSIS: ICD-10-CM

## 2025-04-11 DIAGNOSIS — R45.86 EMOTIONAL LABILITY: ICD-10-CM

## 2025-04-11 DIAGNOSIS — Z01.411 ENCOUNTER FOR GYNECOLOGICAL EXAMINATION (GENERAL) (ROUTINE) WITH ABNORMAL FINDINGS: ICD-10-CM

## 2025-04-11 DIAGNOSIS — R23.2 FLUSHING: ICD-10-CM

## 2025-04-11 DIAGNOSIS — R92.343 MAMMOGRAPHIC EXTREME DENSITY, BILATERAL BREASTS: ICD-10-CM

## 2025-04-11 DIAGNOSIS — Z87.42 PERSONAL HISTORY OF OTHER DISEASES OF THE FEMALE GENITAL TRACT: ICD-10-CM

## 2025-04-11 DIAGNOSIS — R87.810 ATYPICAL SQUAMOUS CELLS OF UNDETERMINED SIGNIFICANCE ON CYTOLOGIC SMEAR OF CERVIX (ASC-US): ICD-10-CM

## 2025-04-11 DIAGNOSIS — R53.83 OTHER FATIGUE: ICD-10-CM

## 2025-04-11 PROCEDURE — 99396 PREV VISIT EST AGE 40-64: CPT

## 2025-04-11 PROCEDURE — 99459 PELVIC EXAMINATION: CPT | Mod: NC

## 2025-04-12 PROBLEM — Z87.898 HISTORY OF NIGHT SWEATS: Status: RESOLVED | Noted: 2024-04-08 | Resolved: 2025-04-12

## 2025-04-12 PROBLEM — R53.83: Status: RESOLVED | Noted: 2024-04-08 | Resolved: 2025-04-12

## 2025-04-12 PROBLEM — Z01.411 ENCOUNTER FOR WELL WOMAN EXAM WITH ABNORMAL FINDINGS: Status: RESOLVED | Noted: 2024-04-08 | Resolved: 2025-04-12

## 2025-04-12 PROBLEM — R23.2 HOT FLASHES: Status: RESOLVED | Noted: 2024-04-08 | Resolved: 2025-04-12

## 2025-04-12 PROBLEM — R45.86 MOOD SWINGS: Status: RESOLVED | Noted: 2024-04-08 | Resolved: 2025-04-12

## 2025-04-12 PROBLEM — Z87.42 HISTORY OF METRORRHAGIA: Status: RESOLVED | Noted: 2024-04-08 | Resolved: 2025-04-12

## 2025-04-15 LAB — HPV HIGH+LOW RISK DNA PNL CVX: NOT DETECTED

## 2025-04-17 LAB — CYTOLOGY CVX/VAG DOC THIN PREP: NORMAL

## 2025-09-04 ENCOUNTER — APPOINTMENT (OUTPATIENT)
Dept: OBGYN | Facility: CLINIC | Age: 55
End: 2025-09-04
Payer: COMMERCIAL

## 2025-09-04 VITALS — WEIGHT: 136 LBS | BODY MASS INDEX: 25.03 KG/M2 | HEIGHT: 62 IN

## 2025-09-04 DIAGNOSIS — N95.1 MENOPAUSAL AND FEMALE CLIMACTERIC STATES: ICD-10-CM

## 2025-09-04 DIAGNOSIS — Z32.01 ENCOUNTER FOR PREGNANCY TEST, RESULT POSITIVE: ICD-10-CM

## 2025-09-04 DIAGNOSIS — R79.89 OTHER SPECIFIED ABNORMAL FINDINGS OF BLOOD CHEMISTRY: ICD-10-CM

## 2025-09-04 PROCEDURE — 99215 OFFICE O/P EST HI 40 MIN: CPT

## 2025-09-04 PROCEDURE — 36415 COLL VENOUS BLD VENIPUNCTURE: CPT

## 2025-09-05 LAB
HCG SERPL-MCNC: 4 MIU/ML
PROLACTIN SERPL-MCNC: 26.8 NG/ML